# Patient Record
Sex: FEMALE | Race: WHITE | NOT HISPANIC OR LATINO | RURAL
[De-identification: names, ages, dates, MRNs, and addresses within clinical notes are randomized per-mention and may not be internally consistent; named-entity substitution may affect disease eponyms.]

---

## 2022-10-28 ENCOUNTER — HOSPITAL ENCOUNTER (OUTPATIENT)
Dept: CARDIOLOGY | Facility: HOSPITAL | Age: 62
Discharge: HOME | End: 2022-10-28
Attending: INTERNAL MEDICINE

## 2022-10-28 DIAGNOSIS — R00.2 PALPITATIONS: ICD-10-CM

## 2022-10-28 DIAGNOSIS — R00.2 PALPITATIONS: Primary | ICD-10-CM

## 2022-10-28 PROCEDURE — 93306 TTE W/DOPPLER COMPLETE: CPT | Mod: 26,,, | Performed by: INTERNAL MEDICINE

## 2022-10-28 PROCEDURE — 93306 CV EXTERNAL ECHO (CUPID ONLY): ICD-10-PCS | Mod: 26,,, | Performed by: INTERNAL MEDICINE

## 2022-10-31 ENCOUNTER — OUTSIDE PLACE OF SERVICE (OUTPATIENT)
Dept: CARDIOLOGY | Facility: HOSPITAL | Age: 62
End: 2022-10-31

## 2022-10-31 LAB — EJECTION FRACTION: 55 %

## 2022-10-31 PROCEDURE — 93227 PR EXT ECG RECORD CONTIN 48 HR, PHYS REVIEW&INTERP: ICD-10-PCS | Mod: ,,, | Performed by: INTERNAL MEDICINE

## 2022-10-31 PROCEDURE — 93227 XTRNL ECG REC<48 HR R&I: CPT | Mod: ,,, | Performed by: INTERNAL MEDICINE

## 2022-11-10 ENCOUNTER — OFFICE VISIT (OUTPATIENT)
Dept: CARDIOLOGY | Facility: CLINIC | Age: 62
End: 2022-11-10

## 2022-11-10 VITALS
HEART RATE: 51 BPM | BODY MASS INDEX: 48.98 KG/M2 | OXYGEN SATURATION: 96 % | WEIGHT: 249.5 LBS | DIASTOLIC BLOOD PRESSURE: 93 MMHG | SYSTOLIC BLOOD PRESSURE: 198 MMHG | HEIGHT: 60 IN

## 2022-11-10 DIAGNOSIS — G47.33 OBSTRUCTIVE SLEEP APNEA OF ADULT: ICD-10-CM

## 2022-11-10 DIAGNOSIS — R07.9 CHEST PAIN, UNSPECIFIED TYPE: ICD-10-CM

## 2022-11-10 DIAGNOSIS — I10 ESSENTIAL HYPERTENSION: ICD-10-CM

## 2022-11-10 DIAGNOSIS — R06.02 SOB (SHORTNESS OF BREATH): Primary | ICD-10-CM

## 2022-11-10 DIAGNOSIS — E66.01 MORBID OBESITY: ICD-10-CM

## 2022-11-10 PROCEDURE — 99205 OFFICE O/P NEW HI 60 MIN: CPT | Mod: ,,, | Performed by: INTERNAL MEDICINE

## 2022-11-10 PROCEDURE — 93246 EXT ECG>7D<15D RECORDING: CPT | Performed by: INTERNAL MEDICINE

## 2022-11-10 PROCEDURE — 99205 PR OFFICE/OUTPT VISIT, NEW, LEVL V, 60-74 MIN: ICD-10-PCS | Mod: ,,, | Performed by: INTERNAL MEDICINE

## 2022-11-10 RX ORDER — HYDROCHLOROTHIAZIDE 25 MG/1
25 TABLET ORAL DAILY
COMMUNITY
Start: 2022-10-11

## 2022-11-10 RX ORDER — MECLIZINE HYDROCHLORIDE 25 MG/1
TABLET ORAL
COMMUNITY
Start: 2022-07-16

## 2022-11-10 RX ORDER — MELOXICAM 15 MG/1
15 TABLET ORAL DAILY
COMMUNITY
Start: 2022-10-11

## 2022-11-10 RX ORDER — LEVOTHYROXINE SODIUM 75 UG/1
75 TABLET ORAL DAILY
COMMUNITY
Start: 2022-10-21

## 2022-11-10 RX ORDER — METOPROLOL TARTRATE 50 MG/1
25 TABLET ORAL 2 TIMES DAILY
COMMUNITY
Start: 2022-11-04

## 2022-11-10 NOTE — PROGRESS NOTES
Cardiology Clinic Note:    PCP: Vanessa Mcgregor NP    REFERRING PHYSICIAN:     CHIEF COMPLAINT: Chest pain, palpitations    HISTORY OF PRESENT ILLNESS: Pt reports episodes of her heart racing and skipping, last seconds up to 10 hours, come and go spontaneously.  Most severe episodes started one evening, spontaneously, lasted overnight, was able to sleep on and off, spontaneously resolved in AM.  Palpitations are not associated with chest pain, nausea or shortness of breath.    She also complains of chest pain, mid sternal, radiates into top of left breast, 4/10 at most severe, provoked by heavy exercise, lasts three to five minutes, resolves with rest.  Chest pain is associated with mild shortness of breath, no nausea, diaphoresis.  She is avoiding exercise which she thinks will provoke chest pain.   She is active, works as a , gained 30 plus pounds over last year.                                      Review of Systems:  Review of Systems   Constitutional: Negative for diaphoresis, malaise/fatigue, night sweats and weight gain.   HENT:  Negative for congestion, ear pain, hearing loss, nosebleeds and sore throat.    Eyes:  Negative for blurred vision, double vision, pain, photophobia and visual disturbance.   Cardiovascular:  Positive for chest pain, dyspnea on exertion and palpitations. Negative for claudication, irregular heartbeat, leg swelling, near-syncope, orthopnea and syncope.   Respiratory:  Negative for cough, shortness of breath, sleep disturbances due to breathing, snoring and wheezing.    Endocrine: Negative for cold intolerance, heat intolerance, polydipsia, polyphagia and polyuria.   Hematologic/Lymphatic: Negative for bleeding problem. Does not bruise/bleed easily.   Skin:  Negative for dry skin, flushing, itching, rash and skin cancer.   Musculoskeletal:  Negative for arthritis, back pain, falls, joint pain, muscle cramps, muscle weakness and myalgias.   Gastrointestinal:  Positive for  abdominal pain. Negative for change in bowel habit, constipation, diarrhea, dysphagia, heartburn, nausea and vomiting.   Genitourinary:  Negative for bladder incontinence, dysuria, flank pain, frequency and nocturia.   Neurological:  Negative for dizziness, focal weakness, headaches, light-headedness, loss of balance, numbness, paresthesias and seizures.   Psychiatric/Behavioral:  Positive for depression. Negative for memory loss and substance abuse. The patient is not nervous/anxious.    Allergic/Immunologic: Negative for environmental allergies.        PAST MEDICAL HISTORY:  No past medical history on file.    PAST SURGICAL HISTORY:  No past surgical history on file.  History reviewed. No pertinent surgical history.   SOCIAL HISTORY:  Social History     Socioeconomic History    Marital status: Single   Tobacco Use    Smoking status: Never    Smokeless tobacco: Never   Substance and Sexual Activity    Alcohol use: Never    Drug use: Never       FAMILY HISTORY:  Family History   Problem Relation Age of Onset    Heart attack Mother     Diabetes Mother     Kidney failure Mother     Hypertension Mother     Stroke Father     Hypertension Father     Diabetes Father     ALS Father     Hypertension Brother     Diabetes Brother        ALLERGIES:  Allergies as of 11/10/2022    (No Known Allergies)         MEDICATIONS:  Current Outpatient Medications on File Prior to Visit   Medication Sig Dispense Refill    hydroCHLOROthiazide (HYDRODIURIL) 25 MG tablet Take 25 mg by mouth once daily. Take 1/2 to one tablet po once daily      levothyroxine (SYNTHROID) 75 MCG tablet Take 75 mcg by mouth once daily.      meclizine (ANTIVERT) 25 mg tablet TAKE 1/2 TO 1 (ONE-HALF TO ONE) TABLET BY MOUTH THREE TIMES DAILY AS NEEDED FOR VERTIGO      meloxicam (MOBIC) 15 MG tablet Take 15 mg by mouth once daily.      metoprolol tartrate (LOPRESSOR) 50 MG tablet Take 25 mg by mouth 2 (two) times daily.       No current  facility-administered medications on file prior to visit.          PHYSICAL EXAM:  Blood pressure (!) 198/93, pulse (!) 51, height 5' (1.524 m), weight 113.2 kg (249 lb 8 oz), SpO2 96 %.      Physical Exam  Vitals and nursing note reviewed.   Constitutional:       Appearance: Normal appearance. She is obese.   HENT:      Head: Normocephalic and atraumatic.      Right Ear: External ear normal.      Left Ear: External ear normal.   Eyes:      General: No scleral icterus.        Right eye: No discharge.         Left eye: No discharge.      Extraocular Movements: Extraocular movements intact.      Conjunctiva/sclera: Conjunctivae normal.      Pupils: Pupils are equal, round, and reactive to light.   Cardiovascular:      Rate and Rhythm: Normal rate and regular rhythm.      Pulses: Normal pulses.      Heart sounds: Murmur heard.     No friction rub. No gallop.   Pulmonary:      Effort: Pulmonary effort is normal.      Breath sounds: Normal breath sounds. No wheezing, rhonchi or rales.   Chest:      Chest wall: No tenderness.   Abdominal:      General: Abdomen is flat. Bowel sounds are normal. There is no distension.      Palpations: Abdomen is soft.      Tenderness: There is no abdominal tenderness. There is no guarding or rebound.   Musculoskeletal:         General: No swelling or tenderness. Normal range of motion.      Cervical back: Normal range of motion and neck supple.   Skin:     General: Skin is warm and dry.      Findings: No erythema or rash.   Neurological:      General: No focal deficit present.      Mental Status: She is alert and oriented to person, place, and time.      Cranial Nerves: No cranial nerve deficit.      Motor: No weakness.      Gait: Gait normal.   Psychiatric:         Mood and Affect: Mood normal.         Behavior: Behavior normal.         Thought Content: Thought content normal.         Judgment: Judgment normal.        LABS REVIEWED:  No results found for: WBC, RBC, HGB, HCT, MCV, MCH,  MCHC, RDW, PLT, MPV, NRBC, INR  No results found for: NA, K, CL, CO2, BUN, MG, PHOS  No results found for: CPK, AST, ALT  No results found for: GLU, HGBA1C  No results found for: CHOL, HDL, LDL, TRIG, CHOLHDL    CARDIAC STUDIES REVIEWED:    OTHER IMAGING STUDIES REVIEWED:        ASSESSMENT: PLAN:  1. Chest pain, atypical, however multiple risk factors, will order Lexiscan Cardiolyte stress imaging to evaluate for ischemic substrate.  2. Morbid obesity with thirty pound weight gain over last year, discussed lifestyle changes., caloric restriction, hold on increasing exercise until have stress results.    3. Hypertension, not well controlled, will monitor BP and record AM and PM for review at next office visit.   4. MARISOL: daytime somnolence, snoring, will refer  for evaluation of by sleep medicine.

## 2022-11-16 PROBLEM — I10 ESSENTIAL HYPERTENSION: Status: ACTIVE | Noted: 2022-11-16

## 2022-11-16 PROBLEM — R06.02 SOB (SHORTNESS OF BREATH): Status: ACTIVE | Noted: 2022-11-16

## 2022-11-16 PROBLEM — E66.01 MORBID OBESITY: Status: ACTIVE | Noted: 2022-11-16

## 2022-11-16 PROBLEM — R07.9 CHEST PAIN: Status: ACTIVE | Noted: 2022-11-16

## 2022-11-16 PROBLEM — G47.33 OBSTRUCTIVE SLEEP APNEA OF ADULT: Status: ACTIVE | Noted: 2022-11-16
